# Patient Record
Sex: FEMALE
[De-identification: names, ages, dates, MRNs, and addresses within clinical notes are randomized per-mention and may not be internally consistent; named-entity substitution may affect disease eponyms.]

---

## 2018-12-18 ENCOUNTER — HOSPITAL ENCOUNTER (EMERGENCY)
Dept: HOSPITAL 31 - C.ER | Age: 38
Discharge: HOME | End: 2018-12-18
Payer: COMMERCIAL

## 2018-12-18 VITALS
TEMPERATURE: 97.7 F | SYSTOLIC BLOOD PRESSURE: 118 MMHG | OXYGEN SATURATION: 98 % | RESPIRATION RATE: 16 BRPM | DIASTOLIC BLOOD PRESSURE: 80 MMHG | HEART RATE: 73 BPM
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
## 2018-12-18 DIAGNOSIS — S69.92XA: Primary | ICD-10-CM
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
## 2018-12-18 NOTE — RAD
PROCEDURE:  Left wrist radiographs 



Left hand radiographs 



HISTORY:

 fall, hand pain 



COMPARISON:

None available.



FINDINGS:



BONES:

No acute displaced fracture. 



JOINTS:

No dislocation. 



SOFT TISSUES:

Mild soft tissue swelling.  The patient's ring obscures evaluation of 

the 4th proximal phalanx.  



OTHER FINDINGS:

None.



IMPRESSION:

Mild soft tissue swelling.  The patient's ring obscures evaluation of 

the 4th proximal phalanx. 



No acute displaced or dislocation identified.



If symptoms persist, or if there is continued clinical concern, x-ray 

follow-up in 7-10 days should be considered.

## 2018-12-18 NOTE — C.PDOC
History Of Present Illness


39 y/o F p/w L hand pain s/p fall 7 hours ago. States tripped on elevated 

sidewalk and braced fall with L hand. Now complains of pain in the palm which is

sharp, worse with movement or palpation. Denies headstrike, LOC, nausea, 

vomiting, numbness or weakness. 


Time Seen by Provider: 12/18/18 17:24


Chief Complaint (Nursing): Upper Extremity Problem/Injury





Past Medical History


Vital Signs: 





                                Last Vital Signs











Temp  98.7 F   12/18/18 17:19


 


Pulse  76   12/18/18 17:19


 


Resp  18   12/18/18 17:19


 


BP  115/81   12/18/18 17:19


 


Pulse Ox  97   12/18/18 17:19











Family History: States: No Known Family Hx





Review Of Systems


Except As Marked, All Systems Reviewed And Found Negative.


Constitutional: Negative for: Fever


Cardiovascular: Negative for: Chest Pain





Physical Exam





- Physical Exam


Additional Physical Exam Comments: 


Gen: NAD


Head: NC/AT


Neck: No midline tenderness


CV: Radila pulse 2+


MSK: Tenderness to proximal hand, no snuffbox tenderness


Skin: Abrasion to palm


Neuro: Moves all digits





ED Course And Treatment


O2 Sat by Pulse Oximetry: 97





Medical Decision Making


Medical Decision Making: 


Patient declined pain medication. 





XR hand and wrist negative for fracture or dislocation. Recommended ACE, 

elevation, ice, ibuprofen. 





Disposition





- Disposition


Referrals: 


Chris Carlisle MD [Staff Provider] - 


Disposition: HOME/ ROUTINE


Disposition Time: 18:28


Condition: STABLE


Instructions:  Common Wrist Injuries


Forms:  [employer] Connect (English)





- Clinical Impression


Clinical Impression: 


 Wrist injury